# Patient Record
Sex: FEMALE | Race: WHITE | NOT HISPANIC OR LATINO | URBAN - METROPOLITAN AREA
[De-identification: names, ages, dates, MRNs, and addresses within clinical notes are randomized per-mention and may not be internally consistent; named-entity substitution may affect disease eponyms.]

---

## 2017-06-22 ENCOUNTER — OUTPATIENT (OUTPATIENT)
Dept: OUTPATIENT SERVICES | Facility: HOSPITAL | Age: 62
LOS: 1 days | End: 2017-06-22
Payer: COMMERCIAL

## 2017-06-22 VITALS
SYSTOLIC BLOOD PRESSURE: 130 MMHG | HEART RATE: 70 BPM | HEIGHT: 65 IN | RESPIRATION RATE: 16 BRPM | WEIGHT: 139.99 LBS | DIASTOLIC BLOOD PRESSURE: 80 MMHG | TEMPERATURE: 98 F

## 2017-06-22 DIAGNOSIS — Z90.49 ACQUIRED ABSENCE OF OTHER SPECIFIED PARTS OF DIGESTIVE TRACT: Chronic | ICD-10-CM

## 2017-06-22 DIAGNOSIS — S52.90XA UNSPECIFIED FRACTURE OF UNSPECIFIED FOREARM, INITIAL ENCOUNTER FOR CLOSED FRACTURE: ICD-10-CM

## 2017-06-22 LAB
BUN SERPL-MCNC: 14 MG/DL — SIGNIFICANT CHANGE UP (ref 7–23)
CALCIUM SERPL-MCNC: 9.7 MG/DL — SIGNIFICANT CHANGE UP (ref 8.4–10.5)
CHLORIDE SERPL-SCNC: 106 MMOL/L — SIGNIFICANT CHANGE UP (ref 98–107)
CO2 SERPL-SCNC: 24 MMOL/L — SIGNIFICANT CHANGE UP (ref 22–31)
CREAT SERPL-MCNC: 0.84 MG/DL — SIGNIFICANT CHANGE UP (ref 0.5–1.3)
GLUCOSE SERPL-MCNC: 93 MG/DL — SIGNIFICANT CHANGE UP (ref 70–99)
HCT VFR BLD CALC: 42.4 % — SIGNIFICANT CHANGE UP (ref 34.5–45)
HGB BLD-MCNC: 13.7 G/DL — SIGNIFICANT CHANGE UP (ref 11.5–15.5)
MCHC RBC-ENTMCNC: 31.7 PG — SIGNIFICANT CHANGE UP (ref 27–34)
MCHC RBC-ENTMCNC: 32.3 % — SIGNIFICANT CHANGE UP (ref 32–36)
MCV RBC AUTO: 98.1 FL — SIGNIFICANT CHANGE UP (ref 80–100)
PLATELET # BLD AUTO: 301 K/UL — SIGNIFICANT CHANGE UP (ref 150–400)
PMV BLD: 9.7 FL — SIGNIFICANT CHANGE UP (ref 7–13)
POTASSIUM SERPL-MCNC: 4.1 MMOL/L — SIGNIFICANT CHANGE UP (ref 3.5–5.3)
POTASSIUM SERPL-SCNC: 4.1 MMOL/L — SIGNIFICANT CHANGE UP (ref 3.5–5.3)
RBC # BLD: 4.32 M/UL — SIGNIFICANT CHANGE UP (ref 3.8–5.2)
RBC # FLD: 12.9 % — SIGNIFICANT CHANGE UP (ref 10.3–14.5)
SODIUM SERPL-SCNC: 146 MMOL/L — HIGH (ref 135–145)
WBC # BLD: 5.68 K/UL — SIGNIFICANT CHANGE UP (ref 3.8–10.5)
WBC # FLD AUTO: 5.68 K/UL — SIGNIFICANT CHANGE UP (ref 3.8–10.5)

## 2017-06-22 PROCEDURE — 93010 ELECTROCARDIOGRAM REPORT: CPT

## 2017-06-22 NOTE — H&P PST ADULT - NEGATIVE GENERAL GENITOURINARY SYMPTOMS
no urinary hesitancy/no flank pain R/no hematuria/no flank pain L/no nocturia/no incontinence/no dysuria/no bladder infections/normal urinary frequency/no renal colic

## 2017-06-22 NOTE — H&P PST ADULT - PROBLEM SELECTOR PLAN 1
Pt is scheduled for operative fixation left distal radius with exparel for 6/23/17. Preop instructions, pepcid surgical scrub provided. Pt stated understanding. Discussed with Dr Bermudez Anesthesiology.

## 2017-06-22 NOTE — H&P PST ADULT - MUSCULOSKELETAL COMMENTS
bilateral Colles' radius fracture see HPI bilateral upper extremity with cast intact, able to wiggle digits, warm to touch, cap refill <3 sec

## 2017-06-22 NOTE — H&P PST ADULT - NEGATIVE CARDIOVASCULAR SYMPTOMS
no palpitations/no peripheral edema/no dyspnea on exertion/no orthopnea/no claudication/no chest pain/no paroxysmal nocturnal dyspnea

## 2017-06-22 NOTE — H&P PST ADULT - NEGATIVE GENERAL SYMPTOMS
no sweating/no fatigue/no malaise/no polyuria/no polydipsia/no weight gain/no anorexia/no weight loss/no chills/no fever/no polyphagia

## 2017-06-22 NOTE — H&P PST ADULT - NEGATIVE OPHTHALMOLOGIC SYMPTOMS
no discharge R/no pain L/no diplopia/no blurred vision R/no discharge L/no pain R/no loss of vision L/no blurred vision L/no loss of vision R

## 2017-06-22 NOTE — H&P PST ADULT - REASON FOR ADMISSION
Colles' fracture of right radius, initial encounter for closed fracture  Colles' fracture of left radius, initial encounter for closed fracture

## 2017-06-22 NOTE — H&P PST ADULT - GASTROINTESTINAL DETAILS
nontender/no rigidity/no organomegaly/no bruit/no masses palpable/soft/no rebound tenderness/no guarding/bowel sounds normal/no distention

## 2017-06-22 NOTE — H&P PST ADULT - NEGATIVE MUSCULOSKELETAL SYMPTOMS
no arthralgia/no arthritis/no joint swelling/no stiffness/no muscle weakness/no myalgia/no neck pain

## 2017-06-22 NOTE — H&P PST ADULT - HISTORY OF PRESENT ILLNESS
61 year old female with Hx of bilateral Colles' right and left radius fracture, occurred while slipped and fell on patio on 6/20/17, denies other injury, or LOC, evaluated at [facility]'s ED, completed X-rays and splinted, followed up with Dr Mchugh who repeat X-rays and recommended right radius surgery. Pt is scheduled for operative fixation left distal radius with exparel for 6/23/17. 61 year old female with Hx of bilateral Colles' right and left radius fracture, occurred while slipped and fell on patio on 6/20/17, denies other injury, or LOC, evaluated at [facility]'s ED, completed X-rays and splinted, followed up with Dr Mchugh who repeat X-rays and recommended left radius surgery. Pt is scheduled for operative fixation left distal radius with exparel for 6/23/17.

## 2017-06-22 NOTE — H&P PST ADULT - NEGATIVE NEUROLOGICAL SYMPTOMS
no difficulty walking/no facial palsy/no paresthesias/no headache/no vertigo/no generalized seizures/no syncope/no focal seizures/no transient paralysis/no weakness/no tremors/no confusion/no loss of sensation

## 2017-06-22 NOTE — H&P PST ADULT - NEGATIVE ENMT SYMPTOMS
no hearing difficulty/no nasal congestion/no vertigo/no throat pain/no nasal obstruction/no post-nasal discharge/no sinus symptoms/no nasal discharge/no ear pain/no tinnitus/no dysphagia

## 2017-06-23 ENCOUNTER — OUTPATIENT (OUTPATIENT)
Dept: OUTPATIENT SERVICES | Facility: HOSPITAL | Age: 62
LOS: 1 days | Discharge: HOME | End: 2017-06-23

## 2017-06-23 VITALS
SYSTOLIC BLOOD PRESSURE: 116 MMHG | HEART RATE: 66 BPM | OXYGEN SATURATION: 96 % | RESPIRATION RATE: 16 BRPM | TEMPERATURE: 98 F | DIASTOLIC BLOOD PRESSURE: 75 MMHG

## 2017-06-23 VITALS
TEMPERATURE: 98 F | HEIGHT: 65 IN | DIASTOLIC BLOOD PRESSURE: 88 MMHG | HEART RATE: 74 BPM | RESPIRATION RATE: 16 BRPM | WEIGHT: 139.99 LBS | SYSTOLIC BLOOD PRESSURE: 141 MMHG | OXYGEN SATURATION: 100 %

## 2017-06-23 DIAGNOSIS — Z90.49 ACQUIRED ABSENCE OF OTHER SPECIFIED PARTS OF DIGESTIVE TRACT: Chronic | ICD-10-CM

## 2017-06-23 DIAGNOSIS — S52.531A COLLES' FRACTURE OF RIGHT RADIUS, INITIAL ENCOUNTER FOR CLOSED FRACTURE: ICD-10-CM

## 2017-06-23 RX ORDER — ONDANSETRON 8 MG/1
1 TABLET, FILM COATED ORAL
Qty: 15 | Refills: 0 | OUTPATIENT
Start: 2017-06-23 | End: 2017-06-28

## 2017-06-23 RX ORDER — OXYCODONE HYDROCHLORIDE 5 MG/1
2 TABLET ORAL
Qty: 60 | Refills: 0 | OUTPATIENT
Start: 2017-06-23 | End: 2017-06-28

## 2017-06-23 NOTE — ASU DISCHARGE PLAN (ADULT/PEDIATRIC). - NOTIFY
Inability to Tolerate Liquids or Foods/Numbness, color, or temperature change to extremity/Pain not relieved by Medications/Fever greater than 101/Swelling that continues

## 2017-06-23 NOTE — ASU DISCHARGE PLAN (ADULT/PEDIATRIC). - NURSING INSTRUCTIONS
Narcotic pain medication may cause nausea or constipation. Take medication with food. Increase fluids and fiber intake.  No creams, lotions, powders  or ointments to incision site.

## 2017-06-26 DIAGNOSIS — S52.531A COLLES' FRACTURE OF RIGHT RADIUS, INITIAL ENCOUNTER FOR CLOSED FRACTURE: ICD-10-CM
